# Patient Record
Sex: FEMALE | Race: WHITE | NOT HISPANIC OR LATINO | Employment: UNEMPLOYED | URBAN - METROPOLITAN AREA
[De-identification: names, ages, dates, MRNs, and addresses within clinical notes are randomized per-mention and may not be internally consistent; named-entity substitution may affect disease eponyms.]

---

## 2020-01-15 LAB
ABO + RH BLD: NORMAL
HIV 1+2 AB+HIV1 P24 AG SERPL QL IA: NORMAL
RPR: NORMAL
RUBELLA IMMUNE STATUS: NORMAL

## 2020-03-25 VITALS — SYSTOLIC BLOOD PRESSURE: 138 MMHG | DIASTOLIC BLOOD PRESSURE: 76 MMHG | WEIGHT: 207.19 LBS

## 2020-04-06 ENCOUNTER — ROUTINE PRENATAL (OUTPATIENT)
Dept: OBSTETRICS AND GYNECOLOGY | Facility: CLINIC | Age: 40
End: 2020-04-06
Payer: COMMERCIAL

## 2020-04-06 VITALS
SYSTOLIC BLOOD PRESSURE: 132 MMHG | BODY MASS INDEX: 41.46 KG/M2 | HEIGHT: 60 IN | WEIGHT: 211.19 LBS | DIASTOLIC BLOOD PRESSURE: 86 MMHG

## 2020-04-06 DIAGNOSIS — Z34.83 PRENATAL CARE, SUBSEQUENT PREGNANCY, THIRD TRIMESTER: ICD-10-CM

## 2020-04-06 DIAGNOSIS — Z86.32 HISTORY OF GESTATIONAL DIABETES IN PRIOR PREGNANCY, CURRENTLY PREGNANT IN THIRD TRIMESTER: ICD-10-CM

## 2020-04-06 DIAGNOSIS — O09.293 HISTORY OF GESTATIONAL DIABETES IN PRIOR PREGNANCY, CURRENTLY PREGNANT IN THIRD TRIMESTER: ICD-10-CM

## 2020-04-06 PROCEDURE — 0502F PR SUBSEQUENT PRENATAL CARE: ICD-10-PCS | Mod: S$GLB,,, | Performed by: OBSTETRICS & GYNECOLOGY

## 2020-04-06 PROCEDURE — 0502F SUBSEQUENT PRENATAL CARE: CPT | Mod: S$GLB,,, | Performed by: OBSTETRICS & GYNECOLOGY

## 2020-04-07 PROBLEM — Z34.83 PRENATAL CARE, SUBSEQUENT PREGNANCY, THIRD TRIMESTER: Status: ACTIVE | Noted: 2020-04-07

## 2020-04-07 PROBLEM — Z86.32 HISTORY OF GESTATIONAL DIABETES IN PRIOR PREGNANCY, CURRENTLY PREGNANT IN THIRD TRIMESTER: Status: ACTIVE | Noted: 2020-04-07

## 2020-04-07 PROBLEM — O09.293 HISTORY OF GESTATIONAL DIABETES IN PRIOR PREGNANCY, CURRENTLY PREGNANT IN THIRD TRIMESTER: Status: ACTIVE | Noted: 2020-04-07

## 2020-04-07 NOTE — PROGRESS NOTES
Patient with h/o GDM in prior pregnancies. At this time will give glucometer and check accucheks as though presumed diabetic and patient will begin diabetic diet. Taking supplements at home-->reviewed and appropriate for pregnancy. Plan for televisit next visit.

## 2020-04-16 ENCOUNTER — PATIENT MESSAGE (OUTPATIENT)
Dept: OBSTETRICS AND GYNECOLOGY | Facility: CLINIC | Age: 40
End: 2020-04-16

## 2020-04-27 ENCOUNTER — PATIENT MESSAGE (OUTPATIENT)
Dept: OBSTETRICS AND GYNECOLOGY | Facility: CLINIC | Age: 40
End: 2020-04-27

## 2020-04-27 ENCOUNTER — OFFICE VISIT (OUTPATIENT)
Dept: OBSTETRICS AND GYNECOLOGY | Facility: CLINIC | Age: 40
End: 2020-04-27
Payer: COMMERCIAL

## 2020-04-27 DIAGNOSIS — Z34.83 PRENATAL CARE, SUBSEQUENT PREGNANCY, THIRD TRIMESTER: ICD-10-CM

## 2020-04-27 DIAGNOSIS — O09.293 HISTORY OF GESTATIONAL DIABETES IN PRIOR PREGNANCY, CURRENTLY PREGNANT IN THIRD TRIMESTER: ICD-10-CM

## 2020-04-27 DIAGNOSIS — Z86.32 HISTORY OF GESTATIONAL DIABETES IN PRIOR PREGNANCY, CURRENTLY PREGNANT IN THIRD TRIMESTER: ICD-10-CM

## 2020-04-27 PROCEDURE — 0502F PR SUBSEQUENT PRENATAL CARE: ICD-10-PCS | Mod: ,,, | Performed by: OBSTETRICS & GYNECOLOGY

## 2020-04-27 PROCEDURE — 0502F SUBSEQUENT PRENATAL CARE: CPT | Mod: ,,, | Performed by: OBSTETRICS & GYNECOLOGY

## 2020-05-18 ENCOUNTER — OFFICE VISIT (OUTPATIENT)
Dept: OBSTETRICS AND GYNECOLOGY | Facility: CLINIC | Age: 40
End: 2020-05-18
Payer: COMMERCIAL

## 2020-05-18 DIAGNOSIS — Z34.83 PRENATAL CARE, SUBSEQUENT PREGNANCY, THIRD TRIMESTER: ICD-10-CM

## 2020-05-18 DIAGNOSIS — Z86.32 HISTORY OF GESTATIONAL DIABETES IN PRIOR PREGNANCY, CURRENTLY PREGNANT IN THIRD TRIMESTER: Primary | ICD-10-CM

## 2020-05-18 DIAGNOSIS — O09.293 HISTORY OF GESTATIONAL DIABETES IN PRIOR PREGNANCY, CURRENTLY PREGNANT IN THIRD TRIMESTER: Primary | ICD-10-CM

## 2020-05-18 PROCEDURE — 99213 OFFICE O/P EST LOW 20 MIN: CPT | Mod: 95,,, | Performed by: OBSTETRICS & GYNECOLOGY

## 2020-05-18 PROCEDURE — 99213 PR OFFICE/OUTPT VISIT, EST, LEVL III, 20-29 MIN: ICD-10-PCS | Mod: 95,,, | Performed by: OBSTETRICS & GYNECOLOGY

## 2020-05-18 NOTE — PROGRESS NOTES
The patient location is: Home  The chief complaint leading to consultation is: Prenatal Visit    Visit type: audio only    Face to Face time with patient: None  10 minutes of total time spent on the encounter, which includes face to face time and non-face to face time preparing to see the patient (eg, review of tests), Obtaining and/or reviewing separately obtained history, Documenting clinical information in the electronic or other health record, Independently interpreting results (not separately reported) and communicating results to the patient/family/caregiver, or Care coordination (not separately reported).         Each patient to whom he or she provides medical services by telemedicine is:  (1) informed of the relationship between the physician and patient and the respective role of any other health care provider with respect to management of the patient; and (2) notified that he or she may decline to receive medical services by telemedicine and may withdraw from such care at any time.    Notes:   PTL precautions  Growth next visit  No complaints. Good fetal movement. No bleeding or contractions.

## 2020-06-03 ENCOUNTER — ROUTINE PRENATAL (OUTPATIENT)
Dept: OBSTETRICS AND GYNECOLOGY | Facility: CLINIC | Age: 40
End: 2020-06-03
Payer: COMMERCIAL

## 2020-06-03 VITALS — WEIGHT: 222 LBS | DIASTOLIC BLOOD PRESSURE: 82 MMHG | BODY MASS INDEX: 43.36 KG/M2 | SYSTOLIC BLOOD PRESSURE: 138 MMHG

## 2020-06-03 DIAGNOSIS — O09.293 HISTORY OF GESTATIONAL DIABETES IN PRIOR PREGNANCY, CURRENTLY PREGNANT IN THIRD TRIMESTER: Primary | ICD-10-CM

## 2020-06-03 DIAGNOSIS — Z86.32 HISTORY OF GESTATIONAL DIABETES IN PRIOR PREGNANCY, CURRENTLY PREGNANT IN THIRD TRIMESTER: Primary | ICD-10-CM

## 2020-06-03 DIAGNOSIS — O09.523 MULTIGRAVIDA OF ADVANCED MATERNAL AGE IN THIRD TRIMESTER: ICD-10-CM

## 2020-06-03 PROCEDURE — 0502F SUBSEQUENT PRENATAL CARE: CPT | Mod: S$GLB,,, | Performed by: OBSTETRICS & GYNECOLOGY

## 2020-06-03 PROCEDURE — 0502F PR SUBSEQUENT PRENATAL CARE: ICD-10-PCS | Mod: S$GLB,,, | Performed by: OBSTETRICS & GYNECOLOGY

## 2020-06-03 NOTE — PROGRESS NOTES
Patient with unknown LMP.  She suspects that conception occurred around September 8.  On ultrasound today BRI consistent with July 6, 2020.  Previously ultrasound at 12 weeks with an alternate date.  Given patient's history of suspected BRI and today's ultrasound will designate July 6th as due date.  Labor precautions.  Plan for follow-up in 1 week.  She previously has had vaginal deliveries at 38 weeks.  She reports that she is feeling content consistent contractions at times.  Will continue to monitor blood pressure.

## 2020-06-11 ENCOUNTER — PROCEDURE VISIT (OUTPATIENT)
Dept: OBSTETRICS AND GYNECOLOGY | Facility: CLINIC | Age: 40
End: 2020-06-11
Payer: COMMERCIAL

## 2020-06-11 ENCOUNTER — ROUTINE PRENATAL (OUTPATIENT)
Dept: OBSTETRICS AND GYNECOLOGY | Facility: CLINIC | Age: 40
End: 2020-06-11
Payer: COMMERCIAL

## 2020-06-11 VITALS
SYSTOLIC BLOOD PRESSURE: 154 MMHG | WEIGHT: 221.81 LBS | DIASTOLIC BLOOD PRESSURE: 82 MMHG | BODY MASS INDEX: 43.32 KG/M2

## 2020-06-11 DIAGNOSIS — Z36.85 ANTENATAL SCREENING FOR STREPTOCOCCUS B: ICD-10-CM

## 2020-06-11 DIAGNOSIS — O09.523 MULTIGRAVIDA OF ADVANCED MATERNAL AGE IN THIRD TRIMESTER: ICD-10-CM

## 2020-06-11 DIAGNOSIS — O09.523 MULTIGRAVIDA OF ADVANCED MATERNAL AGE IN THIRD TRIMESTER: Primary | ICD-10-CM

## 2020-06-11 PROCEDURE — 76819 FETAL BIOPHYS PROFIL W/O NST: CPT | Mod: S$GLB,,, | Performed by: OBSTETRICS & GYNECOLOGY

## 2020-06-11 PROCEDURE — 99213 OFFICE O/P EST LOW 20 MIN: CPT | Mod: 25,S$GLB,, | Performed by: OBSTETRICS & GYNECOLOGY

## 2020-06-11 PROCEDURE — 76815 PR  US,PREGNANT UTERUS,LIMITED, 1/> FETUSES: ICD-10-PCS | Mod: S$GLB,,, | Performed by: OBSTETRICS & GYNECOLOGY

## 2020-06-11 PROCEDURE — 99213 PR OFFICE/OUTPT VISIT, EST, LEVL III, 20-29 MIN: ICD-10-PCS | Mod: 25,S$GLB,, | Performed by: OBSTETRICS & GYNECOLOGY

## 2020-06-11 PROCEDURE — 76815 OB US LIMITED FETUS(S): CPT | Mod: S$GLB,,, | Performed by: OBSTETRICS & GYNECOLOGY

## 2020-06-11 PROCEDURE — 76819 PR US, OB, FETAL BIOPHYSICAL, W/O NST: ICD-10-PCS | Mod: S$GLB,,, | Performed by: OBSTETRICS & GYNECOLOGY

## 2020-06-12 LAB
GROUP B STREP MOLECULAR: NEGATIVE
PENICILLIN ALLERGIC: NO

## 2020-06-16 ENCOUNTER — PATIENT MESSAGE (OUTPATIENT)
Dept: OBSTETRICS AND GYNECOLOGY | Facility: CLINIC | Age: 40
End: 2020-06-16

## 2020-06-17 ENCOUNTER — ROUTINE PRENATAL (OUTPATIENT)
Dept: OBSTETRICS AND GYNECOLOGY | Facility: CLINIC | Age: 40
End: 2020-06-17
Payer: COMMERCIAL

## 2020-06-17 ENCOUNTER — OUTSIDE PLACE OF SERVICE (OUTPATIENT)
Dept: OBSTETRICS AND GYNECOLOGY | Facility: CLINIC | Age: 40
End: 2020-06-17

## 2020-06-17 VITALS — SYSTOLIC BLOOD PRESSURE: 160 MMHG | DIASTOLIC BLOOD PRESSURE: 90 MMHG | WEIGHT: 220 LBS | BODY MASS INDEX: 42.97 KG/M2

## 2020-06-17 DIAGNOSIS — O14.93 PRE-ECLAMPSIA IN THIRD TRIMESTER: Primary | ICD-10-CM

## 2020-06-17 DIAGNOSIS — Z86.32 HISTORY OF GESTATIONAL DIABETES IN PRIOR PREGNANCY, CURRENTLY PREGNANT IN THIRD TRIMESTER: ICD-10-CM

## 2020-06-17 DIAGNOSIS — Z34.83 PRENATAL CARE, SUBSEQUENT PREGNANCY, THIRD TRIMESTER: ICD-10-CM

## 2020-06-17 DIAGNOSIS — O09.293 HISTORY OF GESTATIONAL DIABETES IN PRIOR PREGNANCY, CURRENTLY PREGNANT IN THIRD TRIMESTER: ICD-10-CM

## 2020-06-17 PROCEDURE — 59400 OBSTETRICAL CARE: CPT | Mod: ,,, | Performed by: OBSTETRICS & GYNECOLOGY

## 2020-06-17 PROCEDURE — 59400 PR FULL ROUT OBSTE CARE,VAGINAL DELIV: ICD-10-PCS | Mod: ,,, | Performed by: OBSTETRICS & GYNECOLOGY

## 2020-06-17 PROCEDURE — 0502F SUBSEQUENT PRENATAL CARE: CPT | Mod: S$GLB,,, | Performed by: OBSTETRICS & GYNECOLOGY

## 2020-06-17 PROCEDURE — 0502F PR SUBSEQUENT PRENATAL CARE: ICD-10-PCS | Mod: S$GLB,,, | Performed by: OBSTETRICS & GYNECOLOGY

## 2020-06-17 NOTE — PROGRESS NOTES
Given blood pressure concern for severe preeclampsia.  Will send patient over to the hospital for induction of labor.  Currently no headache right upper quadrant pain or changes in vision.    GBS negative

## 2020-07-28 ENCOUNTER — POSTPARTUM VISIT (OUTPATIENT)
Dept: OBSTETRICS AND GYNECOLOGY | Facility: CLINIC | Age: 40
End: 2020-07-28
Payer: MEDICARE

## 2020-07-28 VITALS — DIASTOLIC BLOOD PRESSURE: 86 MMHG | SYSTOLIC BLOOD PRESSURE: 124 MMHG

## 2020-07-28 PROCEDURE — 0503F POSTPARTUM CARE VISIT: CPT | Mod: S$GLB,,, | Performed by: OBSTETRICS & GYNECOLOGY

## 2020-07-28 PROCEDURE — 0503F PR POSTPARTUM CARE VISIT: ICD-10-PCS | Mod: S$GLB,,, | Performed by: OBSTETRICS & GYNECOLOGY

## 2020-07-28 RX ORDER — IBUPROFEN 800 MG/1
TABLET ORAL
COMMUNITY
Start: 2020-06-19 | End: 2022-11-14

## 2020-07-28 NOTE — PROGRESS NOTES
Subjective:Pt stated she is not breastfeeding, pt denies any vaginal bleeding, also denies resuming sex. Pt wishes to discuss BC options. Pt denies any PPD depression.            Patient ID: Stephanie Austin is a 39 y.o. female.    Chief Complaint:  No chief complaint on file.      History of Present Illness  HPI  See subjective    GYN & OB History  Patient's last menstrual period was 10/19/2019.   Date of Last Pap: No result found    OB History    Para Term  AB Living   4 3 3     3   SAB TAB Ectopic Multiple Live Births           3      # Outcome Date GA Lbr Lan/2nd Weight Sex Delivery Anes PTL Lv   4 Current            3 Term      Vag-Spont EPI  YARELI   2 Term      Vag-Spont   YARELI   1 Term      Vag-Spont EPI  YARELI       Review of Systems  Review of Systems   Constitutional: Negative for activity change, appetite change, chills, diaphoresis, fatigue, fever and unexpected weight change.   Respiratory: Negative for cough and shortness of breath.    Cardiovascular: Negative for chest pain, palpitations and leg swelling.   Gastrointestinal: Negative for abdominal pain, bloating, constipation and diarrhea.   Genitourinary: Negative for decreased libido, dysmenorrhea, vaginal bleeding, vaginal discharge, vaginal pain, vaginal dryness and vaginal odor.   Musculoskeletal: Negative for back pain and joint swelling.   Integumentary:  Negative for rash and acne.   Psychiatric/Behavioral: Negative for depression. The patient is not nervous/anxious.            Objective:    Physical Exam:   Constitutional: She is oriented to person, place, and time. She appears well-developed and well-nourished. She is cooperative.      Neck: No thyroid mass and no thyromegaly present.    Cardiovascular: Normal rate and normal pulses.     Pulmonary/Chest: Effort normal. No respiratory distress.        Abdominal: Soft. Normal appearance. She exhibits no distension. There is no abdominal tenderness. No hernia.     Genitourinary:     Genitourinary Comments: Perineum well-healed             Musculoskeletal: Moves all extremeties.       Neurological: She is alert and oriented to person, place, and time.    Skin: Skin is warm and dry. No rash noted.    Psychiatric: She has a normal mood and affect. Her speech is normal and behavior is normal. Judgment and thought content normal.          Assessment:     Postpartum visit        Plan:      Routine postpartum care

## 2021-03-15 PROBLEM — Z86.32 HISTORY OF GESTATIONAL DIABETES IN PRIOR PREGNANCY, CURRENTLY PREGNANT IN THIRD TRIMESTER: Status: RESOLVED | Noted: 2020-04-07 | Resolved: 2021-03-15

## 2021-03-15 PROBLEM — O09.293 HISTORY OF GESTATIONAL DIABETES IN PRIOR PREGNANCY, CURRENTLY PREGNANT IN THIRD TRIMESTER: Status: RESOLVED | Noted: 2020-04-07 | Resolved: 2021-03-15

## 2022-11-01 DIAGNOSIS — Z32.00 POSSIBLE PREGNANCY: Primary | ICD-10-CM

## 2022-11-11 DIAGNOSIS — N91.2 AMENORRHEA: Primary | ICD-10-CM

## 2022-11-14 ENCOUNTER — PROCEDURE VISIT (OUTPATIENT)
Dept: OBSTETRICS AND GYNECOLOGY | Facility: CLINIC | Age: 42
End: 2022-11-14
Payer: MEDICAID

## 2022-11-14 ENCOUNTER — OFFICE VISIT (OUTPATIENT)
Dept: OBSTETRICS AND GYNECOLOGY | Facility: CLINIC | Age: 42
End: 2022-11-14
Payer: MEDICAID

## 2022-11-14 VITALS
DIASTOLIC BLOOD PRESSURE: 73 MMHG | BODY MASS INDEX: 40.23 KG/M2 | WEIGHT: 206 LBS | HEART RATE: 120 BPM | SYSTOLIC BLOOD PRESSURE: 129 MMHG

## 2022-11-14 DIAGNOSIS — Z34.90 PREGNANCY, UNSPECIFIED GESTATIONAL AGE: Primary | ICD-10-CM

## 2022-11-14 DIAGNOSIS — Z32.00 POSSIBLE PREGNANCY: ICD-10-CM

## 2022-11-14 PROCEDURE — 76801 OB US < 14 WKS SINGLE FETUS: CPT | Mod: S$GLB,,, | Performed by: OBSTETRICS & GYNECOLOGY

## 2022-11-14 PROCEDURE — 3074F PR MOST RECENT SYSTOLIC BLOOD PRESSURE < 130 MM HG: ICD-10-PCS | Mod: CPTII,S$GLB,, | Performed by: OBSTETRICS & GYNECOLOGY

## 2022-11-14 PROCEDURE — 3074F SYST BP LT 130 MM HG: CPT | Mod: CPTII,S$GLB,, | Performed by: OBSTETRICS & GYNECOLOGY

## 2022-11-14 PROCEDURE — 3078F DIAST BP <80 MM HG: CPT | Mod: CPTII,S$GLB,, | Performed by: OBSTETRICS & GYNECOLOGY

## 2022-11-14 PROCEDURE — 1159F PR MEDICATION LIST DOCUMENTED IN MEDICAL RECORD: ICD-10-PCS | Mod: CPTII,S$GLB,, | Performed by: OBSTETRICS & GYNECOLOGY

## 2022-11-14 PROCEDURE — 3078F PR MOST RECENT DIASTOLIC BLOOD PRESSURE < 80 MM HG: ICD-10-PCS | Mod: CPTII,S$GLB,, | Performed by: OBSTETRICS & GYNECOLOGY

## 2022-11-14 PROCEDURE — 3008F BODY MASS INDEX DOCD: CPT | Mod: CPTII,S$GLB,, | Performed by: OBSTETRICS & GYNECOLOGY

## 2022-11-14 PROCEDURE — 76801 US OB/GYN PROCEDURE (VIEWPOINT): ICD-10-PCS | Mod: S$GLB,,, | Performed by: OBSTETRICS & GYNECOLOGY

## 2022-11-14 PROCEDURE — 1159F MED LIST DOCD IN RCRD: CPT | Mod: CPTII,S$GLB,, | Performed by: OBSTETRICS & GYNECOLOGY

## 2022-11-14 PROCEDURE — 99203 OFFICE O/P NEW LOW 30 MIN: CPT | Mod: 25,TH,S$GLB, | Performed by: OBSTETRICS & GYNECOLOGY

## 2022-11-14 PROCEDURE — 99203 PR OFFICE/OUTPT VISIT, NEW, LEVL III, 30-44 MIN: ICD-10-PCS | Mod: 25,TH,S$GLB, | Performed by: OBSTETRICS & GYNECOLOGY

## 2022-11-14 PROCEDURE — 3008F PR BODY MASS INDEX (BMI) DOCUMENTED: ICD-10-PCS | Mod: CPTII,S$GLB,, | Performed by: OBSTETRICS & GYNECOLOGY

## 2022-11-15 NOTE — PROGRESS NOTES
CHIEF COMPLAINT:   Patient presents with      initial OB        HISTORY OF PRESENT ILLNESS  Stephanie Austin 42 y.o.  presents for initial OB  The patient has no complaints today.  Nausea discussed and fluid intake.    No bleeding or pain.    Genetic testing is discussed in detail with her .       OB history:  [unfilled]    LMP: Patient's last menstrual period was 2022 (exact date).  EDC: Estimated Date of Delivery: None noted.  EGA: Unknown       Health Maintenance   Topic Date Due    Hepatitis C Screening  Never done    Lipid Panel  Never done    TETANUS VACCINE  Never done    Mammogram  Never done       History reviewed. No pertinent past medical history.    History reviewed. No pertinent surgical history.    Family History   Problem Relation Age of Onset    Hypertension Father        Social History     Socioeconomic History    Marital status:    Tobacco Use    Smoking status: Never    Smokeless tobacco: Never   Substance and Sexual Activity    Alcohol use: Not Currently    Drug use: Not Currently    Sexual activity: Yes     Partners: Male     Birth control/protection: None       No current outpatient medications on file.     No current facility-administered medications for this visit.       Review of patient's allergies indicates:  No Known Allergies      PHYSICAL EXAM   Vitals:    22 1341   BP: 129/73   Pulse: (!) 120        PAIN SCALE: 0/10 None    PHYSICAL EXAM    ROS:  GENERAL: No fever, chills, fatigability.  CV: Denies chest pain  PULM: Denies shortness of breath or wheezing.  REPRODUCTIVE: No abnormal vaginal bleeding.       PE:   APPEARANCE: Well nourished, well developed, in no acute distress  CHEST: Clear to auscultation bilaterally  CV: Regular rate and rhythm  ABDOMEN: soft  PELVIC:   VULVA: No lesions. Normal female genitalia.  URETHRAL MEATUS: Normal size and location, no lesions, no prolapse.  URETHRA: No masses, tenderness, prolapse or scarring.  VAGINA: Moist and well  rugated, no discharge, no significant cystocele or rectocele.  ANUS PERINEUM: No lesions, no relaxation, no external hemorrhoids.     UPT +    A/P:     -      Patient was counseled today on A.C.S. Pap guidelines and recommendations for yearly pelvic exams, mammograms and monthly self breast exams; to see her PCP for other health maintenance and pregnancy.   -      Patient's medications and medical history reviewed with patient and implications in pregnancy.   -      Pregnancy course discussed and 'AtoZ' book given. Patient was counseled on proper weight gain based on the [place] [employer] [facility]'s recommendations based on her pre-pregnancy weight. Discussed foods to avoid in pregnancy (i.e. sushi, fish that are high in mercury, deli meat, and unpasteurized cheeses). Discussed prenatal vitamin options (i.e. stool softener, DHA). Discussed potential medical problems in pregnancy.  -     Discussed risk of Toxoplasmosis transmission from pets and reviewed risk reduction techniques.  -     Pt was counseled on exercise in pregnancy and weight gain recommendations.  -     Pt was counseled on travel recommendations and on risks of Covid 19 virus exposure.    -     Covid prevention reviewed with patient  -     Oriented to practice including call coverage     -       Pt is aware we call all results. If she does not hear from our office regarding her result within a week of having a study or procedure performed she is to call the office so that we can research the result for her as I do not know when she is scheduling her procedures.

## 2022-11-16 LAB
CHLAMYDIA: NEGATIVE
GONORRHEA: NEGATIVE
SOURCE: NORMAL
SOURCE: NORMAL
TRICHOMONAS AMPLIFIED: NEGATIVE

## 2022-12-12 ENCOUNTER — ROUTINE PRENATAL (OUTPATIENT)
Dept: OBSTETRICS AND GYNECOLOGY | Facility: CLINIC | Age: 42
End: 2022-12-12
Payer: MEDICAID

## 2022-12-12 VITALS
DIASTOLIC BLOOD PRESSURE: 87 MMHG | WEIGHT: 204 LBS | HEART RATE: 108 BPM | SYSTOLIC BLOOD PRESSURE: 146 MMHG | BODY MASS INDEX: 39.84 KG/M2

## 2022-12-12 DIAGNOSIS — Z34.81 PRENATAL CARE, SUBSEQUENT PREGNANCY, FIRST TRIMESTER: Primary | ICD-10-CM

## 2022-12-12 PROCEDURE — 99213 PR OFFICE/OUTPT VISIT, EST, LEVL III, 20-29 MIN: ICD-10-PCS | Mod: TH,S$GLB,, | Performed by: OBSTETRICS & GYNECOLOGY

## 2022-12-12 PROCEDURE — 99213 OFFICE O/P EST LOW 20 MIN: CPT | Mod: TH,S$GLB,, | Performed by: OBSTETRICS & GYNECOLOGY

## 2022-12-17 LAB
ABS NRBC COUNT: 0 X 10 3/UL (ref 0–0.01)
ABSOLUTE BASOPHIL: 0.07 X 10 3/UL (ref 0–0.22)
ABSOLUTE EOSINOPHIL: 0.03 X 10 3/UL (ref 0.04–0.54)
ABSOLUTE IMMATURE GRAN: 0.02 X 10 3/UL (ref 0–0.04)
ABSOLUTE LYMPHOCYTE: 2.99 X 10 3/UL (ref 0.86–4.75)
ABSOLUTE MONOCYTE: 0.76 X 10 3/UL (ref 0.22–1.08)
AMORPH URATE CRY URNS QL MICRO: ABNORMAL
ANTIBODY SCREEN: NEGATIVE
BACTERIA #/AREA URNS HPF: ABNORMAL /[HPF]
BASOPHILS NFR BLD: 0.5 % (ref 0.2–1.2)
BILIRUB UR QL STRIP: NEGATIVE
BLOOD GROUPING: NORMAL
BLOOD TYPE (D): POSITIVE
CLARITY UR: CLEAR
COLOR UR: YELLOW
EOSINOPHIL NFR BLD: 0.2 % (ref 0.7–7)
EPITHELIAL CELLS: ABNORMAL
GLUCOSE (UA): NEGATIVE MG/DL
HBV SURFACE AG SERPL QL IA: NONREACTIVE
HCT VFR BLD AUTO: 41.4 % (ref 37–47)
HEMOGLOBIN A1: 97 % (ref 96–99)
HEMOGLOBIN A2: 3 % (ref 1.5–3.5)
HEMOGLOBIN C: NORMAL
HEMOGLOBIN F: NORMAL
HEMOGLOBIN S: NORMAL
HEP BSAG CONFIRMATION: NORMAL
HGB BLD-MCNC: 14.3 G/DL (ref 12–16)
HGB FRACT BLD-IMP: NORMAL
HIV 1+2 AB+HIV1 P24 AG SERPL QL IA: NONREACTIVE
HIV TESTING:: NORMAL
HSV 1 IGM TITER: NORMAL
HSV 1 IGM, IFA: NEGATIVE
HSV 2 IGM TITER: NORMAL
HSV 2 IGM, IFA: NEGATIVE
HSV1 IGG SER-ACNC: REACTIVE
HSV2 IGG SER-ACNC: NONREACTIVE
IMMATURE GRANULOCYTES: 0.2 % (ref 0–0.5)
KETONES UR QL STRIP: NEGATIVE MG/DL
LEUKOCYTE ESTERASE UR QL STRIP: ABNORMAL
LYMPHOCYTES NFR BLD: 23.4 % (ref 19.3–53.1)
MCH RBC QN AUTO: 32.4 PG (ref 27–32)
MCHC RBC AUTO-ENTMCNC: 34.5 G/DL (ref 32–36)
MCV RBC AUTO: 93.7 FL (ref 82–100)
MONOCYTES NFR BLD: 5.9 % (ref 4.7–12.5)
MUCOUS THREADS URNS QL MICRO: ABNORMAL
NEUTROPHILS # BLD AUTO: 8.93 X 10 3/UL (ref 2.15–7.56)
NEUTROPHILS NFR BLD: 69.8 % (ref 34–71.1)
NITRITE UR QL STRIP: NEGATIVE
NUCLEATED RED BLOOD CELLS: 0 /100 WBC (ref 0–0.2)
OCCULT BLOOD: ABNORMAL
PH, URINE: 6 (ref 5–7.5)
PLATELET # BLD AUTO: 338 X 10 3/UL (ref 135–400)
PROT UR QL STRIP: 25 MG/DL
RBC # BLD AUTO: 4.42 X 10 6/UL (ref 4.2–5.4)
RBC/HPF: ABNORMAL
RDW-SD: 44.7 FL (ref 37–54)
RUBELLA IGG SCREEN: NORMAL
SP GR UR STRIP: 1.02 (ref 1–1.03)
SYPHILIS TREPONEMAL ANTIBODY: NONREACTIVE
T4, FREE: 0.92 NG/DL (ref 0.93–1.7)
TSH SERPL DL<=0.005 MIU/L-ACNC: 1.61 UIU/ML (ref 0.27–4.2)
URINE CULTURE, ROUTINE: NORMAL
UROBILINOGEN, URINE: NORMAL E.U./DL (ref 0–1)
WBC # BLD: 12.8 X 10 3/UL (ref 4.3–10.8)
WBC/HPF: ABNORMAL

## 2023-01-08 ENCOUNTER — PATIENT MESSAGE (OUTPATIENT)
Dept: OTHER | Facility: OTHER | Age: 43
End: 2023-01-08
Payer: MEDICAID

## 2023-01-09 ENCOUNTER — PATIENT MESSAGE (OUTPATIENT)
Dept: OBSTETRICS AND GYNECOLOGY | Facility: CLINIC | Age: 43
End: 2023-01-09

## 2023-01-09 RX ORDER — PROMETHAZINE HYDROCHLORIDE AND CODEINE PHOSPHATE 6.25; 1 MG/5ML; MG/5ML
5 SOLUTION ORAL EVERY 4 HOURS PRN
Qty: 118 ML | Refills: 0 | Status: SHIPPED | OUTPATIENT
Start: 2023-01-09 | End: 2023-01-12 | Stop reason: SDUPTHER

## 2023-01-10 ENCOUNTER — TELEPHONE (OUTPATIENT)
Dept: OBSTETRICS AND GYNECOLOGY | Facility: CLINIC | Age: 43
End: 2023-01-10
Payer: MEDICAID

## 2023-01-10 RX ORDER — BENZONATATE 100 MG/1
100 CAPSULE ORAL 3 TIMES DAILY PRN
Qty: 30 CAPSULE | Refills: 0 | Status: SHIPPED | OUTPATIENT
Start: 2023-01-10 | End: 2023-01-20

## 2023-01-10 NOTE — TELEPHONE ENCOUNTER
Phone message noted from patient Rx was not availble at her preferred pharmacy, Provider notified of above to address.

## 2023-01-12 ENCOUNTER — PATIENT MESSAGE (OUTPATIENT)
Dept: OBSTETRICS AND GYNECOLOGY | Facility: CLINIC | Age: 43
End: 2023-01-12
Payer: MEDICAID

## 2023-01-12 RX ORDER — PROMETHAZINE HYDROCHLORIDE AND CODEINE PHOSPHATE 6.25; 1 MG/5ML; MG/5ML
5 SOLUTION ORAL EVERY 4 HOURS PRN
Qty: 118 ML | Refills: 0 | Status: SHIPPED | OUTPATIENT
Start: 2023-01-12 | End: 2023-01-22

## 2023-01-12 RX ORDER — AZITHROMYCIN 250 MG/1
TABLET, FILM COATED ORAL
Qty: 6 TABLET | Refills: 0 | Status: SHIPPED | OUTPATIENT
Start: 2023-01-12 | End: 2023-01-17

## 2023-01-17 ENCOUNTER — ROUTINE PRENATAL (OUTPATIENT)
Dept: OBSTETRICS AND GYNECOLOGY | Facility: CLINIC | Age: 43
End: 2023-01-17
Payer: MEDICAID

## 2023-01-17 VITALS — DIASTOLIC BLOOD PRESSURE: 88 MMHG | SYSTOLIC BLOOD PRESSURE: 132 MMHG | HEART RATE: 130 BPM

## 2023-01-17 DIAGNOSIS — O02.1 FETAL DEMISE BEFORE 20 WEEKS WITH RETENTION OF DEAD FETUS: Primary | ICD-10-CM

## 2023-01-17 LAB
BASOPHILS NFR BLD: 0.4 % (ref 0–3)
EOSINOPHIL NFR BLD: 0.2 % (ref 1–3)
ERYTHROCYTE [DISTWIDTH] IN BLOOD BY AUTOMATED COUNT: 13.3 % (ref 12.5–18)
HCT VFR BLD AUTO: 39.4 % (ref 37–47)
HGB BLD-MCNC: 13.9 G/DL (ref 12–16)
LYMPHOCYTES NFR BLD: 26.1 % (ref 25–40)
MCH RBC QN AUTO: 32.6 PG (ref 27–31.2)
MCHC RBC AUTO-ENTMCNC: 35.3 G/DL (ref 31.8–35.4)
MCV RBC AUTO: 92.3 FL (ref 80–97)
MONOCYTES NFR BLD: 4.5 % (ref 1–15)
NEUTROPHILS # BLD AUTO: 9.36 10*3/UL (ref 1.8–7.7)
NEUTROPHILS NFR BLD: 68.3 % (ref 37–80)
NUCLEATED RED BLOOD CELLS: 0 %
PLATELETS: 355 10*3/UL (ref 142–424)
RBC # BLD AUTO: 4.27 10*6/UL (ref 4.2–5.4)
WBC # BLD: 13.7 10*3/UL (ref 4.6–10.2)

## 2023-01-17 PROCEDURE — 99213 OFFICE O/P EST LOW 20 MIN: CPT | Mod: TH,S$GLB,, | Performed by: OBSTETRICS & GYNECOLOGY

## 2023-01-17 PROCEDURE — 99213 PR OFFICE/OUTPT VISIT, EST, LEVL III, 20-29 MIN: ICD-10-PCS | Mod: TH,S$GLB,, | Performed by: OBSTETRICS & GYNECOLOGY

## 2023-01-17 NOTE — PROGRESS NOTES
Patient is supposed to be 18 weeks' gestation today.  No fetal cardiac activity on ultrasound and BPD measured at 15 weeks.  Patient was counseled on options in regards to delivery and induction was recommended versus dilation and evacuation.  Patient agreed to this plan of care she will now present to labor and delivery for induction today with Cytotec.  No fetal cardiac activity was confirmed on ultrasound with ultrasonographer today.  Color Doppler flow was applied and no cardiac activity was confirmed.

## 2023-01-18 ENCOUNTER — OUTSIDE PLACE OF SERVICE (OUTPATIENT)
Dept: OBSTETRICS AND GYNECOLOGY | Facility: CLINIC | Age: 43
End: 2023-01-18
Payer: MEDICAID

## 2023-01-18 LAB
RPR: NON REACTIVE
SPECIMEN TO PATHOLOGY: NORMAL

## 2023-01-18 PROCEDURE — 59855 INDUCED ABORTION 1+VAG SUPP: CPT | Mod: ,,, | Performed by: OBSTETRICS & GYNECOLOGY

## 2023-01-18 PROCEDURE — 59855 PR INDUCED AB BY VAG SUPPOS: ICD-10-PCS | Mod: ,,, | Performed by: OBSTETRICS & GYNECOLOGY

## 2023-01-25 ENCOUNTER — PATIENT MESSAGE (OUTPATIENT)
Dept: OBSTETRICS AND GYNECOLOGY | Facility: CLINIC | Age: 43
End: 2023-01-25
Payer: MEDICAID

## 2023-01-29 ENCOUNTER — PATIENT MESSAGE (OUTPATIENT)
Dept: OTHER | Facility: OTHER | Age: 43
End: 2023-01-29
Payer: MEDICAID

## 2023-02-01 ENCOUNTER — OFFICE VISIT (OUTPATIENT)
Dept: OBSTETRICS AND GYNECOLOGY | Facility: CLINIC | Age: 43
End: 2023-02-01
Payer: MEDICAID

## 2023-02-01 VITALS
HEIGHT: 60 IN | BODY MASS INDEX: 40.05 KG/M2 | WEIGHT: 204 LBS | SYSTOLIC BLOOD PRESSURE: 135 MMHG | HEART RATE: 123 BPM | DIASTOLIC BLOOD PRESSURE: 85 MMHG

## 2023-02-01 PROCEDURE — 3079F PR MOST RECENT DIASTOLIC BLOOD PRESSURE 80-89 MM HG: ICD-10-PCS | Mod: CPTII,S$GLB,, | Performed by: OBSTETRICS & GYNECOLOGY

## 2023-02-01 PROCEDURE — 3075F PR MOST RECENT SYSTOLIC BLOOD PRESS GE 130-139MM HG: ICD-10-PCS | Mod: CPTII,S$GLB,, | Performed by: OBSTETRICS & GYNECOLOGY

## 2023-02-01 PROCEDURE — 3008F PR BODY MASS INDEX (BMI) DOCUMENTED: ICD-10-PCS | Mod: CPTII,S$GLB,, | Performed by: OBSTETRICS & GYNECOLOGY

## 2023-02-01 PROCEDURE — 3075F SYST BP GE 130 - 139MM HG: CPT | Mod: CPTII,S$GLB,, | Performed by: OBSTETRICS & GYNECOLOGY

## 2023-02-01 PROCEDURE — 59430 PR CARE AFTER DELIVERY ONLY: ICD-10-PCS | Mod: S$GLB,,, | Performed by: OBSTETRICS & GYNECOLOGY

## 2023-02-01 PROCEDURE — 3079F DIAST BP 80-89 MM HG: CPT | Mod: CPTII,S$GLB,, | Performed by: OBSTETRICS & GYNECOLOGY

## 2023-02-01 PROCEDURE — 3008F BODY MASS INDEX DOCD: CPT | Mod: CPTII,S$GLB,, | Performed by: OBSTETRICS & GYNECOLOGY

## 2023-02-01 NOTE — PROGRESS NOTES
Subjective:       Patient ID: Stephanie Austin is a 42 y.o. female.    Chief Complaint:  No chief complaint on file.      History of Present Illness  HPI  Patient presents for postpartum visit. She is doing well. Grieving appropriately is getting better.     GYN & OB History  Patient's last menstrual period was 2022 (exact date).   Date of Last Pap: No result found    OB History    Para Term  AB Living   5 3 3   1 3   SAB IAB Ectopic Multiple Live Births   1       3      # Outcome Date GA Lbr Lan/2nd Weight Sex Delivery Anes PTL Lv   5 SAB 23 20w3d          4       Vag-Spont      3 Term      Vag-Spont EPI  YARELI      Complications: Diabetes in pregnancy   2 Term      Vag-Spont   YARELI      Complications: Diabetes in pregnancy   1 Term      Vag-Spont EPI  YARELI       Review of Systems  Review of Systems   Constitutional:  Negative for activity change, appetite change, chills, diaphoresis, fatigue, fever and unexpected weight change.   Respiratory:  Negative for cough and shortness of breath.    Cardiovascular:  Negative for chest pain, palpitations and leg swelling.   Gastrointestinal:  Negative for abdominal pain, bloating, constipation and diarrhea.   Genitourinary:  Negative for vaginal bleeding, vaginal discharge, vaginal pain, vaginal dryness and vaginal odor.   Musculoskeletal:  Negative for back pain and joint swelling.   Integumentary:  Negative for rash and acne.   Psychiatric/Behavioral:  Negative for depression. The patient is not nervous/anxious.          Objective:    Physical Exam:   Constitutional: She is oriented to person, place, and time. Vital signs are normal. She appears well-developed and well-nourished. She is cooperative.      Neck: No thyroid mass and no thyromegaly present.    Cardiovascular:  Normal rate and normal pulses.             Pulmonary/Chest: Effort normal. No respiratory distress.        Abdominal: Soft. She exhibits no distension. There is no  abdominal tenderness. No hernia.             Musculoskeletal: Moves all extremeties.       Neurological: She is alert and oriented to person, place, and time.    Skin: Skin is warm and dry. No rash noted.    Psychiatric: She has a normal mood and affect. Her speech is normal and behavior is normal. Judgment and thought content normal.        Assessment:        1. Encounter for postpartum visit                Plan:      Healed. Discussed counseling for future pregnancy. Patient will continue diet and exercise and PNV.

## 2023-09-14 DIAGNOSIS — Z12.31 SCREENING MAMMOGRAM, ENCOUNTER FOR: Primary | ICD-10-CM

## 2023-09-18 ENCOUNTER — OFFICE VISIT (OUTPATIENT)
Dept: OBSTETRICS AND GYNECOLOGY | Facility: CLINIC | Age: 43
End: 2023-09-18
Payer: MEDICAID

## 2023-09-18 ENCOUNTER — PATIENT MESSAGE (OUTPATIENT)
Dept: OBSTETRICS AND GYNECOLOGY | Facility: CLINIC | Age: 43
End: 2023-09-18

## 2023-09-18 VITALS
DIASTOLIC BLOOD PRESSURE: 88 MMHG | SYSTOLIC BLOOD PRESSURE: 133 MMHG | HEIGHT: 60 IN | HEART RATE: 98 BPM | BODY MASS INDEX: 40.44 KG/M2 | WEIGHT: 206 LBS

## 2023-09-18 DIAGNOSIS — E88.810 METABOLIC SYNDROME: ICD-10-CM

## 2023-09-18 DIAGNOSIS — Z01.419 ROUTINE GYNECOLOGICAL EXAMINATION: Primary | ICD-10-CM

## 2023-09-18 PROCEDURE — 3079F DIAST BP 80-89 MM HG: CPT | Mod: CPTII,S$GLB,, | Performed by: OBSTETRICS & GYNECOLOGY

## 2023-09-18 PROCEDURE — 3075F PR MOST RECENT SYSTOLIC BLOOD PRESS GE 130-139MM HG: ICD-10-PCS | Mod: CPTII,S$GLB,, | Performed by: OBSTETRICS & GYNECOLOGY

## 2023-09-18 PROCEDURE — 3075F SYST BP GE 130 - 139MM HG: CPT | Mod: CPTII,S$GLB,, | Performed by: OBSTETRICS & GYNECOLOGY

## 2023-09-18 PROCEDURE — 3008F PR BODY MASS INDEX (BMI) DOCUMENTED: ICD-10-PCS | Mod: CPTII,S$GLB,, | Performed by: OBSTETRICS & GYNECOLOGY

## 2023-09-18 PROCEDURE — 3008F BODY MASS INDEX DOCD: CPT | Mod: CPTII,S$GLB,, | Performed by: OBSTETRICS & GYNECOLOGY

## 2023-09-18 PROCEDURE — 99396 PREV VISIT EST AGE 40-64: CPT | Mod: S$GLB,,, | Performed by: OBSTETRICS & GYNECOLOGY

## 2023-09-18 PROCEDURE — 3079F PR MOST RECENT DIASTOLIC BLOOD PRESSURE 80-89 MM HG: ICD-10-PCS | Mod: CPTII,S$GLB,, | Performed by: OBSTETRICS & GYNECOLOGY

## 2023-09-18 PROCEDURE — 99396 PR PREVENTIVE VISIT,EST,40-64: ICD-10-PCS | Mod: S$GLB,,, | Performed by: OBSTETRICS & GYNECOLOGY

## 2023-09-18 RX ORDER — ONDANSETRON 4 MG/1
4 TABLET, FILM COATED ORAL DAILY PRN
Qty: 30 TABLET | Refills: 1 | Status: SHIPPED | OUTPATIENT
Start: 2023-09-18 | End: 2024-09-17

## 2023-09-18 RX ORDER — SEMAGLUTIDE 1.34 MG/ML
1 INJECTION, SOLUTION SUBCUTANEOUS
Qty: 1.5 ML | Refills: 11 | Status: SHIPPED | OUTPATIENT
Start: 2023-09-18 | End: 2023-10-11 | Stop reason: SDUPTHER

## 2023-09-18 NOTE — PROGRESS NOTES
Subjective:      Patient ID: Stephanie Austin is a 43 y.o. female.    Chief Complaint:  Well Woman      History of Present Illness  HPI  Difficulty with weight loss.    GYN & OB History  Patient's last menstrual period was 2023.   Date of Last Pap: No result found    OB History    Para Term  AB Living   5 3 3   1 3   SAB IAB Ectopic Multiple Live Births   1       3      # Outcome Date GA Lbr Lan/2nd Weight Sex Delivery Anes PTL Lv   5 SAB 23 20w3d          4  2020     Vag-Spont      3 Term      Vag-Spont EPI  YARELI      Complications: Diabetes in pregnancy   2 Term      Vag-Spont   YARELI      Complications: Diabetes in pregnancy   1 Term      Vag-Spont EPI  YARELI       Review of Systems  Review of Systems   Constitutional:  Negative for activity change, appetite change, fatigue, fever and unexpected weight change.   HENT:  Negative for nasal congestion and tinnitus.    Eyes:  Negative for visual disturbance.   Respiratory:  Negative for cough and shortness of breath.    Cardiovascular:  Negative for chest pain and leg swelling.   Gastrointestinal:  Negative for abdominal pain, bloating, blood in stool, constipation and diarrhea.   Genitourinary:  Negative for bladder incontinence, dysuria, vaginal bleeding, vaginal discharge, vaginal pain, vaginal dryness and vaginal odor.   Musculoskeletal:  Negative for arthralgias, back pain and joint swelling.   Integumentary:  Negative for acne.   Neurological:  Negative for headaches.   Psychiatric/Behavioral:  Negative for depression and sleep disturbance. The patient is not nervous/anxious.           Objective:     Physical Exam:   Constitutional: She is oriented to person, place, and time. Vital signs are normal. She appears well-developed and well-nourished. She is cooperative.      Neck: No thyroid mass and no thyromegaly present.    Cardiovascular:  Normal rate, regular rhythm and normal pulses.             Pulmonary/Chest: Effort normal. No  respiratory distress. Chest wall is not dull to percussion. She exhibits no mass, no bony tenderness, no laceration, no crepitus, no edema, no deformity, no swelling and no retraction. Right breast exhibits no inverted nipple, no mass, no nipple discharge, no skin change, no tenderness, presence, no bleeding and no swelling. Left breast exhibits no inverted nipple, no mass, no nipple discharge, no skin change, no tenderness, presence, no bleeding and no swelling.        Abdominal: Soft. She exhibits no distension. There is no abdominal tenderness. No hernia.     Genitourinary:    Vagina, uterus and rectum normal.      Pelvic exam was performed with patient supine.   Labial bartholins normal.There is no rash, tenderness, lesion or injury on the right labia. There is no rash, tenderness, lesion or injury on the left labia. Cervix is normal. Right adnexum displays no mass, no tenderness and no fullness. Left adnexum displays no mass, no tenderness and no fullness. No  no vaginal discharge, rectocele, cystocele or unspecified prolapse of vaginal walls in the vagina.           Musculoskeletal: Moves all extremeties.       Neurological: She is alert and oriented to person, place, and time.    Skin: Skin is warm and dry. No rash noted.    Psychiatric: She has a normal mood and affect. Her speech is normal and behavior is normal. Judgment and thought content normal.         Assessment:     1. Metabolic syndrome    Well Woman Exam        Plan:     Routine care   Plan labs and ozempic  H/o GDM

## 2023-09-19 DIAGNOSIS — E88.810 METABOLIC SYNDROME: Primary | ICD-10-CM

## 2023-09-19 RX ORDER — TIRZEPATIDE 2.5 MG/.5ML
2.5 INJECTION, SOLUTION SUBCUTANEOUS
Qty: 1 PEN | Refills: 11 | Status: SHIPPED | OUTPATIENT
Start: 2023-09-19 | End: 2024-09-18

## 2023-09-20 LAB
ABS NRBC COUNT: 0 X 10 3/UL (ref 0–0.01)
ABSOLUTE BASOPHIL: 0.06 X 10 3/UL (ref 0–0.22)
ABSOLUTE EOSINOPHIL: 0.02 X 10 3/UL (ref 0.04–0.54)
ABSOLUTE IMMATURE GRAN: 0.03 X 10 3/UL (ref 0–0.04)
ABSOLUTE LYMPHOCYTE: 2.97 X 10 3/UL (ref 0.86–4.75)
ABSOLUTE MONOCYTE: 0.72 X 10 3/UL (ref 0.22–1.08)
ALBUMIN SERPL-MCNC: 4.7 G/DL (ref 3.5–5.2)
ALBUMIN/GLOB SERPL ELPH: 1.9 {RATIO} (ref 1–2.7)
ALP ISOS SERPL LEV INH-CCNC: 67 U/L (ref 35–105)
ALT (SGPT): 20 U/L (ref 0–33)
ANION GAP SERPL CALC-SCNC: 10 MMOL/L (ref 8–17)
AST SERPL-CCNC: 16 U/L (ref 0–32)
BASOPHILS NFR BLD: 0.5 % (ref 0.2–1.2)
BILIRUBIN, TOTAL: 0.29 MG/DL (ref 0–1.2)
BUN/CREAT SERPL: 10.9 (ref 6–20)
CALCIUM SERPL-MCNC: 9.4 MG/DL (ref 8.6–10.2)
CARBON DIOXIDE, CO2: 24 MMOL/L (ref 22–29)
CHLORIDE: 107 MMOL/L (ref 98–107)
CHOLEST SERPL-MSCNC: 141 MG/DL (ref 100–200)
CREAT SERPL-MCNC: 0.8 MG/DL (ref 0.5–0.9)
EOSINOPHIL NFR BLD: 0.2 % (ref 0.7–7)
ESTIMATED AVERAGE GLUCOSE: 102 MG/DL
GFR ESTIMATION: 93.7 ML/MIN/1.73M2
GLOBULIN: 2.5 G/DL (ref 1.5–4.5)
GLUCOSE: 134 MG/DL (ref 74–106)
HBA1C MFR BLD: 5.2 % (ref 4–6)
HCT VFR BLD AUTO: 43.1 % (ref 37–47)
HDLC SERPL-MCNC: 37 MG/DL
HGB BLD-MCNC: 15.1 G/DL (ref 12–16)
IMMATURE GRANULOCYTES: 0.3 % (ref 0–0.5)
LDL/HDL RATIO: 2.4 (ref 1–3)
LDLC SERPL CALC-MCNC: 87.8 MG/DL (ref 0–100)
LYMPHOCYTES NFR BLD: 25.8 % (ref 19.3–53.1)
MCH RBC QN AUTO: 32.3 PG (ref 27–32)
MCHC RBC AUTO-ENTMCNC: 35 G/DL (ref 32–36)
MCV RBC AUTO: 92.1 FL (ref 82–100)
MONOCYTES NFR BLD: 6.3 % (ref 4.7–12.5)
NEUTROPHILS # BLD AUTO: 7.69 X 10 3/UL (ref 2.15–7.56)
NEUTROPHILS NFR BLD: 66.9 % (ref 34–71.1)
NUCLEATED RED BLOOD CELLS: 0 /100 WBC (ref 0–0.2)
PLATELET # BLD AUTO: 338 X 10 3/UL (ref 135–400)
POTASSIUM: 3.5 MMOL/L (ref 3.5–5.1)
PROT SNV-MCNC: 7.2 G/DL (ref 6.4–8.3)
RBC # BLD AUTO: 4.68 X 10 6/UL (ref 4.2–5.4)
RDW-SD: 43.3 FL (ref 37–54)
SODIUM: 141 MMOL/L (ref 136–145)
TRIGL SERPL-MCNC: 81 MG/DL (ref 0–150)
TSH SERPL DL<=0.005 MIU/L-ACNC: 2.04 UIU/ML (ref 0.27–4.2)
UREA NITROGEN (BUN): 8.7 MG/DL (ref 6–20)
WBC # BLD: 11.49 X 10 3/UL (ref 4.3–10.8)

## 2023-09-21 ENCOUNTER — PATIENT MESSAGE (OUTPATIENT)
Dept: OBSTETRICS AND GYNECOLOGY | Facility: CLINIC | Age: 43
End: 2023-09-21
Payer: MEDICAID

## 2023-09-21 LAB — Lab: NORMAL

## 2023-09-25 ENCOUNTER — PATIENT MESSAGE (OUTPATIENT)
Dept: OBSTETRICS AND GYNECOLOGY | Facility: CLINIC | Age: 43
End: 2023-09-25
Payer: MEDICAID

## 2023-10-02 ENCOUNTER — PATIENT MESSAGE (OUTPATIENT)
Dept: OBSTETRICS AND GYNECOLOGY | Facility: CLINIC | Age: 43
End: 2023-10-02
Payer: MEDICAID

## 2023-10-11 ENCOUNTER — PATIENT MESSAGE (OUTPATIENT)
Dept: OBSTETRICS AND GYNECOLOGY | Facility: CLINIC | Age: 43
End: 2023-10-11
Payer: MEDICAID

## 2023-10-11 DIAGNOSIS — E11.00 TYPE 2 DIABETES MELLITUS WITH HYPEROSMOLARITY WITHOUT COMA, UNSPECIFIED WHETHER LONG TERM INSULIN USE: Primary | ICD-10-CM

## 2023-10-11 DIAGNOSIS — E88.810 METABOLIC SYNDROME: ICD-10-CM

## 2023-10-11 RX ORDER — SEMAGLUTIDE 1.34 MG/ML
1 INJECTION, SOLUTION SUBCUTANEOUS
Qty: 1.5 ML | Refills: 11 | Status: SHIPPED | OUTPATIENT
Start: 2023-10-11 | End: 2024-10-10

## 2023-10-11 NOTE — PROGRESS NOTES
Upon further discussion patient reports that she has a history of diabetes. Will send therapy. No answer and VM left.

## 2023-10-26 ENCOUNTER — PATIENT MESSAGE (OUTPATIENT)
Dept: OBSTETRICS AND GYNECOLOGY | Facility: CLINIC | Age: 43
End: 2023-10-26
Payer: MEDICAID

## 2023-10-27 ENCOUNTER — PATIENT MESSAGE (OUTPATIENT)
Dept: OBSTETRICS AND GYNECOLOGY | Facility: CLINIC | Age: 43
End: 2023-10-27
Payer: MEDICAID

## 2023-10-30 ENCOUNTER — PATIENT MESSAGE (OUTPATIENT)
Dept: OBSTETRICS AND GYNECOLOGY | Facility: CLINIC | Age: 43
End: 2023-10-30
Payer: MEDICAID

## 2023-10-30 ENCOUNTER — TELEPHONE (OUTPATIENT)
Dept: OBSTETRICS AND GYNECOLOGY | Facility: CLINIC | Age: 43
End: 2023-10-30
Payer: MEDICAID

## 2023-10-30 NOTE — TELEPHONE ENCOUNTER
----- Message from Jamila Nelson sent at 10/30/2023 12:17 PM CDT -----  Contact: vincent Powers is requesting a call in regards to authorization for ozempic. He stated that insurance advised that cost of prescription is still 900 dollars and that authorization is needed. Please call him back at [phone].          Thanks  DD

## 2023-11-03 ENCOUNTER — PATIENT MESSAGE (OUTPATIENT)
Dept: OBSTETRICS AND GYNECOLOGY | Facility: CLINIC | Age: 43
End: 2023-11-03
Payer: MEDICAID

## 2023-11-03 RX ORDER — ONDANSETRON 4 MG/1
4 TABLET, ORALLY DISINTEGRATING ORAL 2 TIMES DAILY
Qty: 60 TABLET | Refills: 11 | Status: SHIPPED | OUTPATIENT
Start: 2023-11-03 | End: 2024-11-02

## 2024-06-19 ENCOUNTER — PATIENT MESSAGE (OUTPATIENT)
Dept: OBSTETRICS AND GYNECOLOGY | Facility: CLINIC | Age: 44
End: 2024-06-19
Payer: MEDICAID